# Patient Record
Sex: FEMALE
[De-identification: names, ages, dates, MRNs, and addresses within clinical notes are randomized per-mention and may not be internally consistent; named-entity substitution may affect disease eponyms.]

---

## 2017-05-19 ENCOUNTER — HOSPITAL ENCOUNTER (EMERGENCY)
Dept: HOSPITAL 14 - H.ER | Age: 77
Discharge: HOME | End: 2017-05-19
Payer: MEDICARE

## 2017-05-19 VITALS
OXYGEN SATURATION: 100 % | DIASTOLIC BLOOD PRESSURE: 64 MMHG | SYSTOLIC BLOOD PRESSURE: 132 MMHG | HEART RATE: 70 BPM | RESPIRATION RATE: 15 BRPM | TEMPERATURE: 98 F

## 2017-05-19 DIAGNOSIS — E78.00: ICD-10-CM

## 2017-05-19 DIAGNOSIS — M25.461: ICD-10-CM

## 2017-05-19 DIAGNOSIS — M25.552: ICD-10-CM

## 2017-05-19 DIAGNOSIS — E03.9: ICD-10-CM

## 2017-05-19 DIAGNOSIS — S09.90XA: Primary | ICD-10-CM

## 2017-05-19 DIAGNOSIS — E11.9: ICD-10-CM
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
## 2017-05-19 DIAGNOSIS — M25.511: ICD-10-CM

## 2017-05-19 DIAGNOSIS — Z79.899: ICD-10-CM

## 2017-05-19 DIAGNOSIS — M25.551: ICD-10-CM

## 2017-05-19 DIAGNOSIS — I10: ICD-10-CM

## 2017-05-19 DIAGNOSIS — Z79.84: ICD-10-CM
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
## 2017-05-19 NOTE — RAD
PROCEDURE:  Right Knee Radiographs.



HISTORY:



COMPARISON:

None available.



FINDINGS:



BONES:

No acute displaced fracture. 



JOINTS:

No dislocation. 



JOINT EFFUSION:

Probable small suprapatellar joint effusion. 



OTHER FINDINGS:

None.



IMPRESSION:

Probable small suprapatellar joint effusion. 



No acute displaced fracture or dislocation identified. Ture, 

dislocation, or significant joint effusion identified.



If symptoms persist, or if there is continued clinical concern, x-ray 

follow-up in 7-10 days should be considered.

## 2017-05-19 NOTE — ED PDOC
HPI: General Adult


Time Seen by Provider: 05/19/17 16:50


Chief Complaint (Nursing): Trauma


Chief Complaint (Provider): Hip pain, right shoulder pain 


History Per: Patient


History/Exam Limitations: no limitations


Onset/Duration Of Symptoms: Days


Have you had recent travel within the past 21 days to any of the following 

countries: [place], [place], [place] or [place]?: No


Current Symptoms Are (Timing): Still Present (.)


Additional Complaint(s): 





Pt state she was trying to make her bed last night when she fell. Pt states 

when she was coming off the bed she miss judged and slipped ffalling to the 

ground. PT reports hitting the left posterior head. No LOC. Pt states she was 

able to get up and get back in bed. Pt states she was sore when she woke up and 

took motrin. Pt states she is feeling better but has hip pain, worse on the 

right and right shoulder pain





Pt reports localized scalp pain but not headache. = 





Past Medical History


Reviewed: Historical Data, Nursing Documentation, Vital Signs


Vital Signs: 


 Last Vital Signs











Temp  98.3 F   05/19/17 16:41


 


Pulse  65   05/19/17 16:41


 


Resp  16   05/19/17 16:41


 


BP  161/83 H  05/19/17 16:41


 


Pulse Ox  99   05/19/17 19:37














- Medical History


PMH: Arthritis, Diabetes (type II), HTN, Hypercholesterolemia, Hyperlipidemia, 

Hypothyroidism


   Denies: Chronic Kidney Disease





- Surgical History


Other surgeries: Right shoulder





- Family History


Family History: States: Unknown Family Hx





- Home Medications


Home Medications: 


 Ambulatory Orders











 Medication  Instructions  Recorded


 


Levothyroxine [Synthroid] 75 mcg PO DAILY 12/27/15


 


Glimepiride [Amaryl] 1 mg PO BID 12/09/16


 


Metformin ER [Glucophage XR] 750 mg PO DAILY 12/09/16


 


Metoprolol Tartrate [Lopressor] 50 mg PO Q12H 12/09/16


 


Simvastatin [Zocor] 20 mg PO HS 12/09/16


 


Valsartan [Diovan] 320 mg PO DAILY 12/09/16














- Allergies


Allergies/Adverse Reactions: 


 Allergies











Allergy/AdvReac Type Severity Reaction Status Date / Time


 


No Known Allergies Allergy   Verified 12/27/15 07:51














Review of Systems


ROS Statement: Except As Marked, All Systems Reviewed And Found Negative


Musculoskeletal: Positive for: Shoulder Pain, Leg Pain





Physical Exam





- Reviewed


Nursing Documentation Reviewed: Yes


Vital Signs Reviewed: Yes





- Physical Exam


Appears: Positive for: Well, Non-toxic, No Acute Distress


Head Exam: Positive for: ATRAUMATIC, NORMAL INSPECTION, NORMOCEPHALIC


Skin: Positive for: Normal Color, Warm, DRY


Eye Exam: Positive for: EOMI, Normal appearance, PERRL


ENT: Positive for: Normal ENT Inspection


Neck: Positive for: Normal, Painless ROM


Cardiovascular/Chest: Positive for: Regular Rate, Rhythm


Respiratory: Positive for: CNT, Normal Breath Sounds


Gastrointestinal/Abdominal: Positive for: Normal Exam, Bowel Sounds, Soft


Back: Positive for: Normal Inspection


Extremity: Positive for: Normal ROM.  Negative for: Tenderness, Deformity, 

Swelling


Neurologic/Psych: Positive for: Alert, CNs II-XII, Oriented, Mood/Affect, 

Cerebellar Tests, Gait.  Negative for: Motor/Sensory Deficits, Aphasia, Facial 

Droop





- ECG


O2 Sat by Pulse Oximetry: 99


Pulse Ox Interpretation: Normal





Medical Decision Making


Medical Decision Making: 





Pt ambulated to the rest room. Pt reports feeling much better on re-evaluation 

without medications. 





x-rays are all normal. 





Disposition





- Clinical Impression


Clinical Impression: 


 Fall, Head injury








- Patient ED Disposition


Is Patient to be Admitted: No


Counseled Patient/Family Regarding: Diagnosis, Need For Followup





- Disposition


Referrals: 


[facility] [Outside]


Disposition: Routine/Home


Disposition Time: 19:36


Condition: GOOD


Instructions:  Fall Prevention (ED)


Print Language: French

## 2017-05-19 NOTE — RAD
PROCEDURE:  Radiographs of the Right Shoulder



HISTORY:

pain, s/p fall



COMPARISON:

None available.



FINDINGS:



BONES:

Postsurgical changes, right humeral head. No acute displaced 

fracture. The distal clavicle and underlying ribs appear intact. 

Degenerative changes of the included portions thoracic spine. 



JOINTS:

No acute dislocation. 



SOFT TISSUES:

Soft tissues appear unremarkable.



IMPRESSION:

No acute displaced fracture or dislocation evident. 



If symptoms persist or if there is continued clinical concern, x-ray 

follow-up in 7-10 days should be considered.

## 2017-05-19 NOTE — RAD
PROCEDURE:  Left Knee Radiographs.



HISTORY:



COMPARISON:

None available



FINDINGS:



BONES:

No acute displaced fracture. 



JOINTS:

No dislocation. 



JOINT EFFUSION:

Small suprapatellar joint effusion. 



OTHER FINDINGS:

None.



IMPRESSION:

Small suprapatellar joint effusion. 



No acute displaced fracture or dislocation identified. If symptoms 

persist, or if there is continued clinical concern, x-ray follow-up 

in 7-10 days should be considered.

## 2017-05-20 NOTE — RAD
Indication: fell off bed, hip pain bilateral



Hip bilateral with pelvis 



Comparison: CT abdomen and pelvis performed 12/9/16 



Findings: 



Osseous demineralization limits evaluation for acute fracture lines.  

Degenerative changes including joint space narrowing of bilateral hip 

joints. Degenerative changes of the included portions lower lumbar 

spine. Examination limited by habitus. No acute displaced fracture or 

dislocation evident. Vascular calcifications.  Soft tissues appear 

grossly unremarkable. No evidence of radiopaque foreign body. 



Impression: 



Osseous demineralization.  Degenerative changes. 



No acute displaced fracture or dislocation identified.  If high 

clinical index of suspicion for occult fracture persists, recommend 

further evaluation with cross-sectional imaging.  Otherwise, If 

symptoms persist or if there is continued clinical concern, x-ray 

follow-up in 7-10 days should be considered.

## 2018-06-13 ENCOUNTER — HOSPITAL ENCOUNTER (EMERGENCY)
Dept: HOSPITAL 14 - H.ER | Age: 78
Discharge: HOME | End: 2018-06-13
Payer: MEDICARE

## 2018-06-13 VITALS — TEMPERATURE: 98 F | HEART RATE: 83 BPM | DIASTOLIC BLOOD PRESSURE: 82 MMHG | SYSTOLIC BLOOD PRESSURE: 149 MMHG

## 2018-06-13 VITALS — RESPIRATION RATE: 16 BRPM

## 2018-06-13 DIAGNOSIS — E03.9: ICD-10-CM

## 2018-06-13 DIAGNOSIS — I10: ICD-10-CM

## 2018-06-13 DIAGNOSIS — J06.9: Primary | ICD-10-CM

## 2018-06-13 DIAGNOSIS — E78.00: ICD-10-CM

## 2018-06-13 DIAGNOSIS — Z79.84: ICD-10-CM

## 2018-06-13 DIAGNOSIS — E11.9: ICD-10-CM

## 2018-06-13 LAB
ALBUMIN SERPL-MCNC: 4.1 G/DL (ref 3.5–5)
ALBUMIN/GLOB SERPL: 1.1 {RATIO} (ref 1–2.1)
ALT SERPL-CCNC: 24 U/L (ref 9–52)
AST SERPL-CCNC: 42 U/L (ref 14–36)
BASOPHILS # BLD AUTO: 0 K/UL (ref 0–0.2)
BASOPHILS NFR BLD: 0.5 % (ref 0–2)
BUN SERPL-MCNC: 13 MG/DL (ref 7–17)
CALCIUM SERPL-MCNC: 9.3 MG/DL (ref 8.4–10.2)
EOSINOPHIL # BLD AUTO: 0.1 K/UL (ref 0–0.7)
EOSINOPHIL NFR BLD: 1 % (ref 0–4)
ERYTHROCYTE [DISTWIDTH] IN BLOOD BY AUTOMATED COUNT: 13.3 % (ref 11.5–14.5)
GFR NON-AFRICAN AMERICAN: > 60
HGB BLD-MCNC: 11.8 G/DL (ref 12–16)
LYMPHOCYTES # BLD AUTO: 2.2 K/UL (ref 1–4.3)
LYMPHOCYTES NFR BLD AUTO: 24.7 % (ref 20–40)
MCH RBC QN AUTO: 30.5 PG (ref 27–31)
MCHC RBC AUTO-ENTMCNC: 33.3 G/DL (ref 33–37)
MCV RBC AUTO: 91.6 FL (ref 81–99)
MONOCYTES # BLD: 0.8 K/UL (ref 0–0.8)
MONOCYTES NFR BLD: 9.6 % (ref 0–10)
NEUTROPHILS # BLD: 5.6 K/UL (ref 1.8–7)
NEUTROPHILS NFR BLD AUTO: 64.2 % (ref 50–75)
NRBC BLD AUTO-RTO: 0 % (ref 0–0)
PLATELET # BLD: 305 K/UL (ref 130–400)
PMV BLD AUTO: 8.3 FL (ref 7.2–11.7)
RBC # BLD AUTO: 3.85 MIL/UL (ref 3.8–5.2)
WBC # BLD AUTO: 8.7 K/UL (ref 4.8–10.8)

## 2018-06-13 NOTE — ED PDOC
History of Present Illness


History of Present Illness: 


77 year old female with a history of HTN, DM, and hypothyroidism presents to 

the ED with flu like symptoms for 5 days. She complains of cough productive of 

yellow sputum, nasal congestion and body aches as well as a mild headache, 

fever and nausea that developed yesterday. Patient visited her PMD yesterday 

who gave her Rx for Doxycycline and Montelukast. She reports feeling worse 

since taking the medications. Denies vomiting. 





PMD: Dr. Franky Mascorro








HPI: Influenza


Time Seen by Provider: 18 19:25


Chief Complaint: Flu-like Symptoms


Chief Complaint (Provider): Flu-like Symptoms


History Per: Patient


Exam Limitations: no limitations


Onset/Duration Of Symptoms: Days (x 5)


Symptoms include: fever, headache, bodyaches, cough, nasal congestion


Risk factors for flu complications: Yes: adult > 65 years





Past Medical History


Reviewed: Historical Data, Nursing Documentation, Vital Signs


Vital Signs: 


 Last Vital Signs











Temp  98.5 F   18 19:38


 


Pulse  91 H  18 19:38


 


Resp  16   18 19:38


 


BP  154/76 H  18 19:38


 


Pulse Ox  97   18 19:38














- Medical History


PMH: Arthritis, Diabetes (type II), HTN, Hypercholesterolemia, Hyperlipidemia, 

Hypothyroidism


   Denies: Chronic Kidney Disease





- Surgical History


Other surgeries: shoulder surgery and cardiac ablasion





- Family History


Family History: States: Unknown Family Hx





- Home Medications


Home Medications: 


 Ambulatory Orders











 Medication  Instructions  Recorded


 


Levothyroxine [Synthroid] 75 mcg PO DAILY 12/27/15


 


Glimepiride [Amaryl] 1 mg PO BID 16


 


MetFORMIN ER [Glucophage XR] 750 mg PO DAILY 16


 


Metoprolol Tartrate [Lopressor] 50 mg PO Q12H 16


 


Simvastatin [Zocor] 20 mg PO HS 16


 


Valsartan [Diovan] 320 mg PO DAILY 16


 


Albuterol HFA [Ventolin HFA 90 1 - 2 puff IH Q4H PRN #1 bottle 18





mcg/actuation (8 g)]  


 


Azithromycin [Zithromax] 250 mg PO DAILY #6 tab 18














- Allergies


Allergies/Adverse Reactions: 


 Allergies











Allergy/AdvReac Type Severity Reaction Status Date / Time


 


No Known Allergies Allergy   Verified 12/27/15 07:51














Review of Systems


ROS Statement: Except As Marked, All Systems Reviewed And Found Negative


Constitutional: Positive for: Fever (yesterday and today), Other (body aches)


ENT: Positive for: Nose Congestion


Respiratory: Positive for: Cough, Sputum (yellow sputum)


Gastrointestinal: Positive for: Nausea (mild ).  Negative for: Vomiting, 

Diarrhea


Neurological: Positive for: Headache





Physical Exam





- Reviewed


Nursing Documentation Reviewed: Yes


Vital Signs Reviewed: Yes





- Physical Exam


Appears: Positive for: Non-toxic


Head Exam: Positive for: ATRAUMATIC, NORMAL INSPECTION, NORMOCEPHALIC


Skin: Positive for: Normal Color, Warm, Dry


Eye Exam: Positive for: EOMI, Normal appearance, PERRL


ENT: Positive for: Nasal Congestion


Neck: Positive for: Normal, Painless ROM, Supple


Cardiovascular/Chest: Positive for: Regular Rate, Rhythm.  Negative for: Murmur


Respiratory: Positive for: Normal Breath Sounds.  Negative for: Respiratory 

Distress


Gastrointestinal/Abdominal: Positive for: Normal Exam, Soft.  Negative for: 

Tenderness


Extremity: Positive for: Normal ROM.  Negative for: Deformity


Neurologic/Psych: Positive for: Alert, Oriented.  Negative for: Motor/Sensory 

Deficits





Medical Decision Making


Medical Decision Makin:11


Impression: flu like symptoms


Initial Plan: 


--CMP 


--CBC 


--Chest x-ray 


--Albuterol .083% 2.5 mg INH 


--Peak flow pre/post


--Influenza AB 





Chest x-ray is negative for pneumonia. 


Results for influenza are negative. 





Time; 22:07 


--Patient reports improvement of symptoms. pt in no respiratory distress


Medications will be changed due to the others making her sick. 


Advised to take prescriptions for albuterol pump and Zithromax. 











--------------------------------------------------------------------------------

-----------------





Scribe Attestation:





Documented by Arlene Dawson, acting as a scribe for Shreyas Resendiz MD 





Provider Scribe Attestation:





All medical record entries made by the Scribe were at my direction and 

personally dictated by me. I have reviewed the chart and agree that the record 

accurately reflects my personal performance of the history, physical exam, 

medical decision making, and the department course for this patient. I have 

also personally directed, reviewed, and agree with the discharge instructions 

and disposition.





- Laboratory Results


Result Diagrams: 


 18 20:39





 18 20:39





- ECG


O2 Sat by Pulse Oximetry: 97





Disposition





- Clinical Impression


Clinical Impression: 


 Upper respiratory infection








- Patient ED Disposition


Is Patient to be Admitted: No


Counseled Patient/Family Regarding: Studies Performed, Diagnosis, Need For 

Followup





- Disposition


Disposition: Routine/Home


Disposition Time: 21:25


Condition: IMPROVED


Additional Instructions: 


follow up with your primary doctor in 1-2 days


return to the ED with any worsening or concerning symptoms 


Prescriptions: 


Albuterol HFA [Ventolin HFA 90 mcg/actuation (8 g)] 1 - 2 puff IH Q4H PRN #1 

bottle


 PRN Reason: Wheezing


Azithromycin [Zithromax] 250 mg PO DAILY #6 tab


Instructions:  Acute Bronchitis, Adult (DC)


Forms:  [employer] [facility] (English)


Print Language: Danish

## 2018-06-14 VITALS — OXYGEN SATURATION: 97 %

## 2018-06-14 NOTE — RAD
HISTORY:

Cough



COMPARISON:

02/24/2012.



TECHNIQUE:

Chest PA and lateral



FINDINGS:



LUNGS:

No active pulmonary disease.



PLEURA:

No significant pleural effusion identified. No pneumothorax apparent.



CARDIOVASCULAR:

 No radiographic findings to suggest acute or significant 

cardiovascular disease.



OSSEOUS STRUCTURES:

No significant abnormalities.



VISUALIZED UPPER ABDOMEN:

Normal.



OTHER FINDINGS:

None.



IMPRESSION:

No active disease. No significant interval change compared to the 

prior examination(s).

## 2018-10-30 ENCOUNTER — HOSPITAL ENCOUNTER (EMERGENCY)
Dept: HOSPITAL 14 - H.ER | Age: 78
LOS: 1 days | Discharge: HOME | End: 2018-10-31
Payer: MEDICARE

## 2018-10-30 VITALS
SYSTOLIC BLOOD PRESSURE: 153 MMHG | DIASTOLIC BLOOD PRESSURE: 72 MMHG | RESPIRATION RATE: 16 BRPM | OXYGEN SATURATION: 98 % | TEMPERATURE: 98.2 F | HEART RATE: 76 BPM

## 2018-10-30 DIAGNOSIS — K44.9: ICD-10-CM

## 2018-10-30 DIAGNOSIS — E03.9: ICD-10-CM

## 2018-10-30 DIAGNOSIS — E78.00: ICD-10-CM

## 2018-10-30 DIAGNOSIS — Z79.84: ICD-10-CM

## 2018-10-30 DIAGNOSIS — R10.9: Primary | ICD-10-CM

## 2018-10-30 DIAGNOSIS — K76.89: ICD-10-CM

## 2018-10-30 DIAGNOSIS — E11.9: ICD-10-CM

## 2018-10-30 DIAGNOSIS — K57.90: ICD-10-CM

## 2018-10-30 DIAGNOSIS — K40.90: ICD-10-CM

## 2018-10-30 DIAGNOSIS — I10: ICD-10-CM

## 2018-10-30 LAB
ALBUMIN SERPL-MCNC: 3.9 G/DL (ref 3.5–5)
ALBUMIN/GLOB SERPL: 1.1 {RATIO} (ref 1–2.1)
ALT SERPL-CCNC: 27 U/L (ref 9–52)
APTT BLD: 36.6 SECONDS (ref 25.6–37.1)
AST SERPL-CCNC: 24 U/L (ref 14–36)
BASOPHILS # BLD AUTO: 0.1 K/UL (ref 0–0.2)
BASOPHILS NFR BLD: 0.9 % (ref 0–2)
BUN SERPL-MCNC: 20 MG/DL (ref 7–17)
CALCIUM SERPL-MCNC: 9.1 MG/DL (ref 8.4–10.2)
EOSINOPHIL # BLD AUTO: 0.2 K/UL (ref 0–0.7)
EOSINOPHIL NFR BLD: 1.6 % (ref 0–4)
ERYTHROCYTE [DISTWIDTH] IN BLOOD BY AUTOMATED COUNT: 14.4 % (ref 11.5–14.5)
GFR NON-AFRICAN AMERICAN: > 60
HGB BLD-MCNC: 12.3 G/DL (ref 12–16)
INR PPP: 1.1
LYMPHOCYTES # BLD AUTO: 3.6 K/UL (ref 1–4.3)
LYMPHOCYTES NFR BLD AUTO: 31.4 % (ref 20–40)
MCH RBC QN AUTO: 30.2 PG (ref 27–31)
MCHC RBC AUTO-ENTMCNC: 33.2 G/DL (ref 33–37)
MCV RBC AUTO: 91.1 FL (ref 81–99)
MONOCYTES # BLD: 0.7 K/UL (ref 0–0.8)
MONOCYTES NFR BLD: 6 % (ref 0–10)
NEUTROPHILS # BLD: 6.8 K/UL (ref 1.8–7)
NEUTROPHILS NFR BLD AUTO: 60.1 % (ref 50–75)
NRBC BLD AUTO-RTO: 0 % (ref 0–0)
PLATELET # BLD: 365 K/UL (ref 130–400)
PMV BLD AUTO: 8 FL (ref 7.2–11.7)
PROTHROMBIN TIME: 12.5 SECONDS (ref 9.8–13.1)
RBC # BLD AUTO: 4.06 MIL/UL (ref 3.8–5.2)
WBC # BLD AUTO: 11.4 K/UL (ref 4.8–10.8)

## 2018-10-30 PROCEDURE — 74177 CT ABD & PELVIS W/CONTRAST: CPT

## 2018-10-30 PROCEDURE — 80053 COMPREHEN METABOLIC PANEL: CPT

## 2018-10-30 PROCEDURE — 99284 EMERGENCY DEPT VISIT MOD MDM: CPT

## 2018-10-30 PROCEDURE — 85610 PROTHROMBIN TIME: CPT

## 2018-10-30 PROCEDURE — 85025 COMPLETE CBC W/AUTO DIFF WBC: CPT

## 2018-10-30 PROCEDURE — 85730 THROMBOPLASTIN TIME PARTIAL: CPT

## 2018-10-30 NOTE — ED PDOC
- Laboratory Results


Result Diagrams: 


                                 10/30/18 21:01





                                 10/30/18 21:40





- ECG


O2 Sat by Pulse Oximetry: 98 (RA)


Pulse Ox Interpretation: Normal





Medical Decision Making


Medical Decision Makin:00 


--Patient signed out to this provider by Dr. Velazquez pending CT and reevaluation. 





00:59 


Abd Pelvis CT w PO & IV contrast


COMMENTS:


Small sliding hiatal hernia.


Benign scattered simple hepatic cysts with the largest measuring 2.3 cm.


Chronic diverticulosis.


Mild amount of fecal residue in the colon.


Mild apparent thickening of the mid aspect of the sigmoid.


Fat containing left inguinal hernia without incarceration.


The liver is of uniform attenuation without mass or defect. There is no intra or

extrahepatic biliary ductal dilatation. The spleen is normal. The gallbladder is

within normal limits. The pancreas is of normal contour and attenuation 

characteristics. There is no evidence of adrenal mass.


Both kidneys demonstrate prompt and equal nephrograms. The kidneys are normal in

size, shape and configuration. There is no evidence of renal or ureteral mass. 

No renal or ureteral calculi are identified. There is no hydroureter or 

hydronephrosis.


No evidence for appendicitis. 


No evidence for small or large bowel obstruction. There is no evidence of 

abdominal ascites or lymphadenopathy.


There is no evidence of intrinsic or extrinsic bladder mass. There is no pelvic 

ascites or lymphadenopathy.


Bilateral basilar subsegmental atelectatic pulmonary changes.


Mild cardiomegaly.


Images of the lung bases show no evidence of pleural or parenchymal mass. There 

are no pleural effusions. The bony structures are free of lytic or blastic 

lesions.





IMPRESSION: 


Small sliding hiatal hernia.


Benign scattered simple hepatic cysts with the largest measuring 2.3 cm.


Chronic diverticulosis.


Mild amount of fecal residue in the colon.


Mild apparent thickening of the mid aspect of the sigmoid. Underdistention, 

spasm versus mild colitis.


Fat containing left inguinal hernia without incarceration.








01:15 


--CT discussed with patient. She reports that symptoms are resolved and she has 

not experienced rectal bleeding since she arrived in the ED. 


--Labs reviewed and revealed no clinically significant abnormalities. Diagnosis 

is diverticulosis. Return precautions provided. She will follow up with PMD. 





 

--------------------------------------------------------------------------------


-----------------


Scribe Attestation:


Documented by Arlene Dawson, acting as a scribe for Ward Cho MD





Provider Scribe Attestation:


All medical record entries made by the Scribe were at my direction and 

personally dictated by me. I have reviewed the chart and agree that the record 

accurately reflects my personal performance of the history, physical exam, 

medical decision making, and the department course for this patient. I have also

personally directed, reviewed, and agree with the discharge instructions and 

disposition.











Disposition





- Clinical Impression


Clinical Impression: 


 Abdominal pain








- POA


Present On Arrival: None





- Disposition


Disposition: Routine/Home


Disposition Time: 01:15


Condition: STABLE


Additional Instructions: 





JUANA SHEPARD, thank you for letting us take care of you today. Your provider 

was Ward Cho MD and you were treated for RECTAL PAIN. The emergency 

medical care you received today was directed at your acute symptoms. If you were

prescribed any medication, please fill it and take as directed. It may take 

several days for your symptoms to resolve. Return to the Emergency Department if

your symptoms worsen, do not improve, or if you have any other problems.





Please contact your doctor or call one of the physicians/clinics you have been 

referred to that are listed on the Patient Visit Information form that is 

included in your discharge packet. Bring any paperwork you were given at 

discharge with you along with any medications you are taking to your follow up 

visit. Our treatment cannot replace ongoing medical care by a primary care 

provider outside of the emergency department.





Thank you for allowing the [facility] team to be part of your care today.








If you had an X-Ray or CT scan: A Radiologist will review the ED reading if any 

change in treatment is needed we will contact you.***





If you had a blood, urine, or wound culture: It will take several days for the 

results, if any change in treatment is needed we will contact you.***





If you had an STI test: It will take 48 hours for the results. Please call after

1 week if you have not heard back.***


Prescriptions: 


Dicyclomine [Bentyl] 20 mg PO Q12 PRN #20 tab


 PRN Reason: abdominal pain/diarrhea


Instructions:  Diverticulosis


Forms:  [facility] (English)


Print Language: Uzbek

## 2018-10-31 NOTE — CT
Date of service: 



10/30/2018



PROCEDURE:  CT Abdomen and Pelvis with contrast



HISTORY:

abd pain



COMPARISON:

12/9/2016



TECHNIQUE:

Contrast dose: 90 mL Omnipaque 300



Radiation dose:



Total exam DLP = 452.04 mGy-cm.



This CT exam was performed using one or more of the following dose 

reduction techniques: Automated exposure control, adjustment of the 

mA and/or kV according to patient size, and/or use of iterative 

reconstruction technique.



FINDINGS:



LOWER THORAX:

Bibasilar discoid atelectasis and/or scarring.  Concomitant 

bronchiectasis and patchy mosaic attenuation consistent with uneven 

ventilation profusion and/or air trapping.  Not significantly changed 

compared the prior study. 



LIVER:

The multiple benign-appearing cystic masses are similar to the 

12/9/2016 study.  Benign bilateral hepatic cysts are compatible with 

this. 



GALLBLADDER AND BILE DUCTS:

Unremarkable. 



PANCREAS:

Unremarkable. No gross lesion or ductal dilatation.



SPLEEN:

Unremarkable. 



ADRENALS:

Unremarkable. No mass. 



KIDNEYS AND URETERS:

Unremarkable. No hydronephrosis. No solid mass. 



VASCULATURE:

. No aortic aneurysm. There is presence (very minimal) of aortic 

atherosclerotic calcification and (probable trace) mural plaque on 

cross sectional studies.



BOWEL:

Rectosigmoid diverticulosis with redundancy.  One loop contiguous 

with probable left ovary--similar in appearance (axial series 3, 

image 117).  No complicating diverticulitis seen.  No bowel 

obstruction.  



APPENDIX:

Normal appendix. 



PERITONEUM:

Unremarkable. No free fluid. No free air. 



LYMPH NODES:

Unremarkable. No enlarged lymph nodes. 



BLADDER:

Unremarkable. 



REPRODUCTIVE:

Unremarkable. 



BONES:

Interval compression deformity of the L1 vertebral body with 

increased sclerosis and interval increase anterior spondylosis with 

T12-L1 intervening disc space narrowing.  The compression deformity 

is an interval change since the prior exam.  No retropulsed ossific 

fragments seen.  Interval progressive anterior thoraco lumbar fundal 

oasis noted. 



OTHER FINDINGS:

Small periumbilical fat only containing hernia similar-appearing.



Fat containing left groin hernia.  No bowel containing hernia 

appreciated. 



Small probable small sliding hiatal hernia. 



IMPRESSION:

Rectosigmoid redundancy with diverticulosis.  No complicating 

diverticulitis or bowel obstruction seen.



Gallstones.  No dilated ducts.



Similar benign-appearing left and right hepatic lobe cysts 



Interval mild compression deformity loss of height approximately 25 

percent of the L1 vertebral body progressive degenerative changes 

here.  No retropulsed ossific fragments noted. 



Concordant results (preliminary interpretation) provided by [facility]ar[facility].

## 2019-01-01 ENCOUNTER — HOSPITAL ENCOUNTER (EMERGENCY)
Dept: HOSPITAL 14 - H.ER | Age: 79
Discharge: HOME | End: 2019-01-01
Payer: MEDICARE

## 2019-01-01 VITALS
DIASTOLIC BLOOD PRESSURE: 76 MMHG | HEART RATE: 78 BPM | SYSTOLIC BLOOD PRESSURE: 130 MMHG | RESPIRATION RATE: 19 BRPM | TEMPERATURE: 97.8 F

## 2019-01-01 VITALS — OXYGEN SATURATION: 98 %

## 2019-01-01 DIAGNOSIS — E03.9: ICD-10-CM

## 2019-01-01 DIAGNOSIS — Z79.84: ICD-10-CM

## 2019-01-01 DIAGNOSIS — E11.9: ICD-10-CM

## 2019-01-01 DIAGNOSIS — R05: Primary | ICD-10-CM

## 2019-01-01 DIAGNOSIS — I25.10: ICD-10-CM

## 2019-01-01 DIAGNOSIS — I10: ICD-10-CM

## 2019-01-01 DIAGNOSIS — E78.00: ICD-10-CM

## 2019-01-01 LAB
BACTERIA #/AREA URNS HPF: (no result) /[HPF]
BASOPHILS # BLD AUTO: 0.1 K/UL (ref 0–0.2)
BASOPHILS NFR BLD: 1 % (ref 0–2)
BILIRUB UR-MCNC: NEGATIVE MG/DL
BUN SERPL-MCNC: 17 MG/DL (ref 7–17)
CALCIUM SERPL-MCNC: 9.3 MG/DL (ref 8.4–10.2)
COLOR UR: YELLOW
EOSINOPHIL # BLD AUTO: 0.1 K/UL (ref 0–0.7)
EOSINOPHIL NFR BLD: 1 % (ref 0–4)
ERYTHROCYTE [DISTWIDTH] IN BLOOD BY AUTOMATED COUNT: 13.7 % (ref 11.5–14.5)
GFR NON-AFRICAN AMERICAN: > 60
GLUCOSE UR STRIP-MCNC: (no result) MG/DL
HGB BLD-MCNC: 11.2 G/DL (ref 12–16)
LEUKOCYTE ESTERASE UR-ACNC: (no result) LEU/UL
LYMPHOCYTES # BLD AUTO: 1.2 K/UL (ref 1–4.3)
LYMPHOCYTES NFR BLD AUTO: 17.4 % (ref 20–40)
MCH RBC QN AUTO: 30.8 PG (ref 27–31)
MCHC RBC AUTO-ENTMCNC: 32.6 G/DL (ref 33–37)
MCV RBC AUTO: 94.3 FL (ref 81–99)
MONOCYTES # BLD: 0.9 K/UL (ref 0–0.8)
MONOCYTES NFR BLD: 13.3 % (ref 0–10)
NEUTROPHILS # BLD: 4.7 K/UL (ref 1.8–7)
NEUTROPHILS NFR BLD AUTO: 67.3 % (ref 50–75)
NRBC BLD AUTO-RTO: 0 % (ref 0–0)
PH UR STRIP: 5 [PH] (ref 5–8)
PLATELET # BLD: 338 K/UL (ref 130–400)
PMV BLD AUTO: 8.2 FL (ref 7.2–11.7)
PROT UR STRIP-MCNC: NEGATIVE MG/DL
RBC # BLD AUTO: 3.64 MIL/UL (ref 3.8–5.2)
RBC # UR STRIP: (no result) /UL
SP GR UR STRIP: 1.02 (ref 1–1.03)
SQUAMOUS EPITHIAL: 3 /HPF (ref 0–5)
URINE CLARITY: (no result)
URINE HYALINE CAST: (no result) /HPF (ref 0–2)
UROBILINOGEN UR-MCNC: (no result) MG/DL (ref 0.2–1)
WBC # BLD AUTO: 6.9 K/UL (ref 4.8–10.8)

## 2019-01-01 PROCEDURE — 99283 EMERGENCY DEPT VISIT LOW MDM: CPT

## 2019-01-01 PROCEDURE — 85025 COMPLETE CBC W/AUTO DIFF WBC: CPT

## 2019-01-01 PROCEDURE — 96374 THER/PROPH/DIAG INJ IV PUSH: CPT

## 2019-01-01 PROCEDURE — 81003 URINALYSIS AUTO W/O SCOPE: CPT

## 2019-01-01 PROCEDURE — 71046 X-RAY EXAM CHEST 2 VIEWS: CPT

## 2019-01-01 PROCEDURE — 80048 BASIC METABOLIC PNL TOTAL CA: CPT

## 2019-01-01 NOTE — ED PDOC
HPI: CCC, URI, Sore Throat


Time Seen by Provider: 01/01/19 13:41


Chief Complaint (Nursing): Flu-like Symptoms


Chief Complaint (Provider): cough


History Per: Family (son),  ([facility] ID# [id number])


History/Exam Limitations: no limitations


Onset/Duration Of Symptoms: Days (x4)


Current Symptoms Are (Timing): Still Present


Additional Complaint(s): 


78 year old female with pmHx of DM, HTN, and CAD, arrives to ED with son for an 

evaluation of a dry cough ongoing for 4 days. Patient also reports a sore throat

that causes her headache and back pain. He denies fever, chills, vomiting, 

change in BM, or recent travel. Patient's son additionally states that patient's

appetite has been minimal.





PCP: Dr. Franky Mascorro





Past Medical History


Reviewed: Historical Data, Nursing Documentation, Vital Signs


Vital Signs: 





                                Last Vital Signs











Temp  98.3 F   01/01/19 13:21


 


Pulse  96 H  01/01/19 13:21


 


Resp  18   01/01/19 13:21


 


BP  148/76   01/01/19 13:21


 


Pulse Ox  98   01/01/19 13:21














- Medical History


PMH: Arthritis, Diabetes (type II), HTN, Hypercholesterolemia, Hyperlipidemia, 

Hypothyroidism


   Denies: Chronic Kidney Disease





- Family History


Family History: States: Unknown Family Hx





- Home Medications


Home Medications: 


                                Ambulatory Orders











 Medication  Instructions  Recorded


 


Levothyroxine [Synthroid] 75 mcg PO DAILY 12/27/15


 


Glimepiride [Amaryl] 1 mg PO BID 12/09/16


 


MetFORMIN ER [Glucophage XR] 750 mg PO DAILY 12/09/16


 


Metoprolol Tartrate [Lopressor] 50 mg PO Q12H 12/09/16


 


Simvastatin [Zocor] 20 mg PO HS 12/09/16


 


Valsartan [Diovan] 320 mg PO DAILY 12/09/16


 


Albuterol HFA [Ventolin HFA 90 1 - 2 puff IH Q4H PRN #1 bottle 06/13/18





mcg/actuation (8 g)]  


 


Azithromycin [Zithromax] 250 mg PO DAILY #6 tab 06/13/18


 


Dicyclomine [Bentyl] 20 mg PO Q12 PRN #20 tab 10/31/18


 


Benzonatate [Tessalon Perle] 100 mg PO TID #20 capsule 01/01/19


 


Ibuprofen [Motrin Tab] 600 mg PO Q6 #30 tab 01/01/19


 


Multivitamin with Iron 1 each PO DAILY #12 tablet 01/01/19





[Multivitamins with Iron]  














- Allergies


Allergies/Adverse Reactions: 


                                    Allergies











Allergy/AdvReac Type Severity Reaction Status Date / Time


 


No Known Allergies Allergy   Verified 12/27/15 07:51














Review of Systems


ROS Statement: Except As Marked, All Systems Reviewed And Found Negative


Constitutional: Negative for: Fever, Chills


ENT: Positive for: Throat Pain


Respiratory: Positive for: Cough (dry)


Gastrointestinal: Positive for: Other (decreased appetite).  Negative for: 

Vomiting, Diarrhea, Constipation


Musculoskeletal: Positive for: Back Pain


Neurological: Positive for: Headache





Physical Exam





- Reviewed


Nursing Documentation Reviewed: Yes





- Physical Exam


Appears: Positive for: Well, Non-toxic, No Acute Distress


Head Exam: Positive for: ATRAUMATIC, NORMAL INSPECTION, NORMOCEPHALIC


Skin: Positive for: Normal Color, Warm, DRY


Eye Exam: Positive for: EOMI, Normal appearance, PERRL


ENT: Positive for: Normal ENT Inspection, Other (scratchy voice).  Negative for:

Pharyngeal Erythema, Tonsillar Swelling


Neck: Positive for: Normal, Painless ROM


Cardiovascular/Chest: Positive for: Regular Rate, Rhythm


Respiratory: Positive for: CNT, Normal Breath Sounds


Gastrointestinal/Abdominal: Positive for: Normal Exam, Soft


Extremity: Positive for: Normal ROM


Neurologic/Psych: Positive for: Alert, Oriented





- Laboratory Results


Result Diagrams: 


                                 01/01/19 14:41





                                 01/01/19 14:41





- ECG


O2 Sat by Pulse Oximetry: 98 (RA)


Pulse Ox Interpretation: Normal





Medical Decision Making


Medical Decision Making: 


Time: 1410


Initial Plan: well-appearing, elderly female with cough and decreased appetite. 

Likely viral illness. Plan to hydrate, labs, CXR, and re-eval.


* Labs


* IV fluids


* Toradol 15mg IVP


* Zofran 4mg PO





1648


-Patient states she's feeling better


-Advised symptomatic treatment, Multi-V's, and followup with PMD


-Son states he will take her to her doctor tomorrow





-----------------------

--------------------------------------------------------------------------------


-----------------


Scribe Attestation:


Documented by Vicky Calvin, acting as a scribe for Allan Abdul MD.


Provider Scribe Attestation:


All medical record entries made by the Scribe were at my direction and 

personally dictated by me. I have reviewed the chart and agree that the record 

accurately reflects my personal performance of the history, physical exam, 

medical decision making, and the department course for this patient. I have also

personally directed, reviewed, and agree with the discharge instructions and 

disposition.





Disposition





- Clinical Impression


Clinical Impression: 


 Cough








- Disposition


Referrals: 


Franky Mascorro MD [Medical Doctor] - 


Disposition: Routine/Home


Disposition Time: 16:30


Condition: GOOD


Prescriptions: 


Benzonatate [Tessalon Perle] 100 mg PO TID #20 capsule


Ibuprofen [Motrin Tab] 600 mg PO Q6 #30 tab


Multivitamin with Iron [Multivitamins with Iron] 1 each PO DAILY #12 tablet


Instructions:  Cough in Adults


Forms:  [employer] (English)

## 2019-01-01 NOTE — RAD
Date of service: 



01/01/2019



HISTORY:

 cough 



COMPARISON:

Comparison chest dated 06/13/2018. 



TECHNIQUE:

Chest PA and lateral



FINDINGS:



LUNGS:

There appears to be some minor linear atelectasis and/or scarring 

change in the left and probably to a lesser degree right lung base.



PLEURA:

No significant pleural effusion identified. No pneumothorax apparent.



CARDIOVASCULAR:

Mild aortic atherosclerotic calcification present.



Normal cardiac size. No pulmonary vascular congestion. 



OSSEOUS STRUCTURES:

Postoperative changes of rotator cuff repair right shoulder 



Mild multilevel degenerative spondylosis of the thoracic spine 



VISUALIZED UPPER ABDOMEN:

Normal.



OTHER FINDINGS:

None.



IMPRESSION:

No active disease.

## 2019-01-19 ENCOUNTER — HOSPITAL ENCOUNTER (EMERGENCY)
Dept: HOSPITAL 14 - H.ER | Age: 79
Discharge: HOME | End: 2019-01-19
Payer: MEDICARE

## 2019-01-19 VITALS — OXYGEN SATURATION: 99 % | HEART RATE: 65 BPM | RESPIRATION RATE: 18 BRPM | TEMPERATURE: 98 F

## 2019-01-19 VITALS — SYSTOLIC BLOOD PRESSURE: 124 MMHG | DIASTOLIC BLOOD PRESSURE: 82 MMHG

## 2019-01-19 DIAGNOSIS — Z79.84: ICD-10-CM

## 2019-01-19 DIAGNOSIS — R07.89: Primary | ICD-10-CM

## 2019-01-19 DIAGNOSIS — I10: ICD-10-CM

## 2019-01-19 DIAGNOSIS — E03.9: ICD-10-CM

## 2019-01-19 DIAGNOSIS — E78.00: ICD-10-CM

## 2019-01-19 DIAGNOSIS — E11.9: ICD-10-CM

## 2019-01-19 LAB
ALBUMIN SERPL-MCNC: 4.2 G/DL (ref 3.5–5)
ALBUMIN/GLOB SERPL: 1.2 {RATIO} (ref 1–2.1)
ALT SERPL-CCNC: 21 U/L (ref 9–52)
APTT BLD: 35.8 SECONDS (ref 25.6–37.1)
AST SERPL-CCNC: 30 U/L (ref 14–36)
BASE EXCESS BLDV CALC-SCNC: 5.3 MMOL/L (ref 0–2)
BASOPHILS # BLD AUTO: 0.1 K/UL (ref 0–0.2)
BASOPHILS NFR BLD: 1.3 % (ref 0–2)
BNP SERPL-MCNC: 118 PG/ML (ref 0–900)
BUN SERPL-MCNC: 24 MG/DL (ref 7–17)
CALCIUM SERPL-MCNC: 9.5 MG/DL (ref 8.4–10.2)
EOSINOPHIL # BLD AUTO: 0.1 K/UL (ref 0–0.7)
EOSINOPHIL NFR BLD: 1.8 % (ref 0–4)
ERYTHROCYTE [DISTWIDTH] IN BLOOD BY AUTOMATED COUNT: 13.3 % (ref 11.5–14.5)
GFR NON-AFRICAN AMERICAN: > 60
HGB BLD-MCNC: 11.3 G/DL (ref 12–16)
INR PPP: 1.2
LYMPHOCYTES # BLD AUTO: 2 K/UL (ref 1–4.3)
LYMPHOCYTES NFR BLD AUTO: 29.1 % (ref 20–40)
MCH RBC QN AUTO: 30.2 PG (ref 27–31)
MCHC RBC AUTO-ENTMCNC: 32.7 G/DL (ref 33–37)
MCV RBC AUTO: 92.4 FL (ref 81–99)
MONOCYTES # BLD: 0.6 K/UL (ref 0–0.8)
MONOCYTES NFR BLD: 9.2 % (ref 0–10)
NEUTROPHILS # BLD: 4 K/UL (ref 1.8–7)
NEUTROPHILS NFR BLD AUTO: 58.6 % (ref 50–75)
NRBC BLD AUTO-RTO: 0 % (ref 0–0)
PCO2 BLDV: 46 MMHG (ref 40–60)
PH BLDV: 7.43 [PH] (ref 7.32–7.43)
PLATELET # BLD: 548 K/UL (ref 130–400)
PMV BLD AUTO: 7.7 FL (ref 7.2–11.7)
PROTHROMBIN TIME: 13.7 SECONDS (ref 9.8–13.1)
RBC # BLD AUTO: 3.72 MIL/UL (ref 3.8–5.2)
VENOUS BLOOD FIO2: 21 %
VENOUS BLOOD GAS PO2: 46 MM/HG (ref 30–55)
WBC # BLD AUTO: 6.9 K/UL (ref 4.8–10.8)

## 2019-01-19 PROCEDURE — 80053 COMPREHEN METABOLIC PANEL: CPT

## 2019-01-19 PROCEDURE — 85025 COMPLETE CBC W/AUTO DIFF WBC: CPT

## 2019-01-19 PROCEDURE — 83880 ASSAY OF NATRIURETIC PEPTIDE: CPT

## 2019-01-19 PROCEDURE — 82948 REAGENT STRIP/BLOOD GLUCOSE: CPT

## 2019-01-19 PROCEDURE — 84484 ASSAY OF TROPONIN QUANT: CPT

## 2019-01-19 PROCEDURE — 85610 PROTHROMBIN TIME: CPT

## 2019-01-19 PROCEDURE — 71045 X-RAY EXAM CHEST 1 VIEW: CPT

## 2019-01-19 PROCEDURE — 93005 ELECTROCARDIOGRAM TRACING: CPT

## 2019-01-19 PROCEDURE — 82803 BLOOD GASES ANY COMBINATION: CPT

## 2019-01-19 PROCEDURE — 85730 THROMBOPLASTIN TIME PARTIAL: CPT

## 2019-01-19 PROCEDURE — 99284 EMERGENCY DEPT VISIT MOD MDM: CPT

## 2019-01-19 PROCEDURE — 96360 HYDRATION IV INFUSION INIT: CPT

## 2019-01-19 NOTE — RAD
Date of service: 



01/19/2019



HISTORY:

 possible admission 



COMPARISON:

Chest radiograph dated 01/01/2019. 



FINDINGS:



LUNGS:

No active pulmonary disease.



PLEURA:

No significant pleural effusion identified, no pneumothorax apparent.



CARDIOVASCULAR:

Aortic atherosclerotic calcifications.  Cardiomediastinal silhouette 

stably enlarged. 



OSSEOUS STRUCTURES:

Unchanged.  Right humeral head surgical tacks.



VISUALIZED UPPER ABDOMEN:

Normal.



OTHER FINDINGS:

None.



IMPRESSION:

No active disease.

## 2019-01-19 NOTE — ED PDOC
HPI: Chest Pain


Time Seen by Provider: 19 12:41


Chief Complaint (Nursing): Chest Pain


Chief Complaint (Provider): left sided chest pain


History Per: Patient


History/Exam Limitations: no limitations


Onset/Duration Of Symptoms: Hrs (this morning)


Current Symptoms Are (Timing): Intermittent Episodes


Associated Symptoms: denies: Nausea, Dyspnea


Additional Complaint(s): 





Riya Goff is an 78 year old female, with a past medical history of 

hypercholesterolemia, HTN, diabetes and hypothyroidism, who presents to the 

emergency department complaining of left sided chest pain onset today. Patient 

states episodes last about 30min and occurred twice this morning. Patient 

reports she is compliant with her medications and is otherwise healthy. She 

denies any radiation of pain or associated nausea, vomiting, dizziness, 

shortness of breath, fever or chills. No further medical complaints.





PMD: Franky Mascorro





Past Medical History


Reviewed: Historical Data, Nursing Documentation, Vital Signs


Vital Signs: 





                                Last Vital Signs











Temp  98 F   19 13:42


 


Pulse  65   19 13:42


 


Resp  18   19 13:42


 


BP  114/67   19 13:42


 


Pulse Ox  99   19 13:42














- Medical History


PMH: Arthritis, Diabetes (type II), HTN, Hypercholesterolemia, Hyperlipidemia, 

Hypothyroidism


   Denies: Chronic Kidney Disease





- Surgical History


Surgical History: 





- Family History


Family History: States: Unknown Family Hx





- Social History


Current smoker - smoking cessation education provided: No


Alcohol: None


Drugs: Denies





- Home Medications


Home Medications: 


                                Ambulatory Orders











 Medication  Instructions  Recorded


 


Levothyroxine [Synthroid] 75 mcg PO DAILY 12/27/15


 


Glimepiride [Amaryl] 1 mg PO BID 16


 


MetFORMIN ER [Glucophage XR] 750 mg PO DAILY 16


 


Metoprolol Tartrate [Lopressor] 50 mg PO Q12H 16


 


Simvastatin [Zocor] 20 mg PO HS 16


 


Valsartan [Diovan] 320 mg PO DAILY 16


 


Albuterol HFA [Ventolin HFA 90 1 - 2 puff IH Q4H PRN #1 bottle 18





mcg/actuation (8 g)]  


 


Azithromycin [Zithromax] 250 mg PO DAILY #6 tab 18


 


Dicyclomine [Bentyl] 20 mg PO Q12 PRN #20 tab 10/31/18


 


Benzonatate [Tessalon Perle] 100 mg PO TID #20 capsule 19


 


Ibuprofen [Motrin Tab] 600 mg PO Q6 #30 tab 19


 


Multivitamin with Iron 1 each PO DAILY #12 tablet 19





[Multivitamins with Iron]  














- Allergies


Allergies/Adverse Reactions: 


                                    Allergies











Allergy/AdvReac Type Severity Reaction Status Date / Time


 


No Known Allergies Allergy   Verified 19 12:28














Review of Systems


ROS Statement: Except As Marked, All Systems Reviewed And Found Negative


Constitutional: Negative for: Fever, Chills


Cardiovascular: Positive for: Chest Pain (left sided intermittent)


Respiratory: Negative for: Shortness of Breath


Gastrointestinal: Negative for: Nausea, Vomiting





Physical Exam





- Reviewed


Nursing Documentation Reviewed: Yes


Vital Signs Reviewed: Yes





- Physical Exam


Appears: Positive for: No Acute Distress


Head Exam: Positive for: ATRAUMATIC, NORMAL INSPECTION, NORMOCEPHALIC


Skin: Positive for: Normal Color, Warm, Dry


Eye Exam: Positive for: Normal appearance, EOMI, PERRL


Neck: Positive for: Normal, Painless ROM, Supple


Cardiovascular/Chest: Positive for: Regular Rate, Rhythm.  Negative for: Murmur


Respiratory: Positive for: Normal Breath Sounds.  Negative for: Respiratory Di

stress


Gastrointestinal/Abdominal: Positive for: Normal Exam, Soft.  Negative for: 

Tenderness, Guarding, Rebound


Back: Positive for: Normal Inspection.  Negative for: L CVA Tenderness, R CVA 

Tenderness, Vertebral Tenderness


Extremity: Positive for: Normal ROM (upper and lower extremities).  Negative 

for: Deformity, Swelling


Neurologic/Psych: Positive for: Alert, Oriented.  Negative for: Motor/Sensory 

Deficits





- Laboratory Results


Result Diagrams: 


                                 19 13:20





                                 19 13:20


Lab Results: 





                                        











pO2  46 mm/Hg (30-55)   19  13:06    


 


VBG pH  7.43  (7.32-7.43)   19  13:06    


 


VBG pCO2  46 mmHg (40-60)   19  13:06    


 


VBG HCO3  28.7 mmol/L  19  13:06    


 


VBG Total CO2  31.9 mmol/L (22-28)  H  19  13:06    


 


VBG O2 Sat (Calc)  88.0 % (40-65)  H  19  13:06    


 


VBG Base Excess  5.3 mmol/L (0.0-2.0)  H  19  13:06    


 


VBG Potassium  3.8 mmol/L (3.6-5.2)   19  13:06    


 


Sodium  130.0 mmol/L (132-148)  L  19  13:06    


 


Chloride  96.0 mmol/L ()  L  19  13:06    


 


Glucose  175 mg/dL ()  H  19  13:06    


 


Lactate  1.3 mmol/L (0.7-2.1)   19  13:06    


 


FiO2  21.0 %  19  13:06    








                                        











PT  13.7 Seconds (9.8-13.1)  H  19  13:20    


 


INR  1.2   19  13:20    


 


APTT  35.8 Seconds (25.6-37.1)   19  13:20    








                                        











Troponin I  < 0.0120 ng/mL (0.00-0.120)   19  13:20    








                                        











NT-Pro-B Natriuret Pep  118 pg/ml (0-900)   19  13:20    








                                        











Total Bilirubin  0.4 mg/dl (0.2-1.3)   19  13:20    


 


AST  30 U/L (14-36)   19  13:20    


 


ALT  21 U/L (9-52)   19  13:20    


 


Alkaline Phosphatase  70 U/L ()   19  13:20    


 


Total Protein  7.8 G/DL (6.3-8.2)   19  13:20    


 


Albumin  4.2 g/dL (3.5-5.0)   19  13:20    


 


Globulin  3.6 gm/dL (2.2-3.9)   19  13:20    


 


Albumin/Globulin Ratio  1.2  (1.0-2.1)   19  13:20    














- ECG


O2 Sat by Pulse Oximetry: 99 (RA)


Pulse Ox Interpretation: Normal





Medical Decision Making


Medical Decision Making: 





Time: 12:41


Initial Impression: Chest pain





Initial Plan:





--EKG


--Aspirin 325mg PO


--NaCl 1,000 ml IV 1,000 mls/hr


--Reevaluation








Work up for chest pain, basic labs, troponin, CXR and reevaluate patient. 

Patient is unsure of medications, will give ASA at this time.








15:25


Discussed with daughter and patient. Labs normal, EKG normal and patient has 

been asymptomatic during ED visit. Daughter comfortable going home with mother 

and will stay with her through the weekend. Patient advised to contact 

cardiologist and PMD today to arrange follow up on Monday. Patient instructed to

return immediately if new symptoms arise or worsen.








-----------------------------------

--------------------------------------------------------------   


Scribe Attestation:


Documented by Kobe Connell, acting as a scribe for Sabine Garzon MD





Provider Scribe Attestation:


All medical record entries made by the Scribe were at my direction and 

personally dictated by me. I have reviewed the chart and agree that the record 

accurately reflects my personal performance of the history, physical exam, 

medical decision making, and the department course for this patient. I have also

personally directed, reviewed, and agree with the discharge instructions and dis

position.





Disposition





- Clinical Impression


Clinical Impression: 


 Chest pain








- Disposition


Disposition: Routine/Home


Disposition Time: 16:05


Condition: IMPROVED


Additional Instructions: 


Follow up with primary medical doctor and cardiologist within 48 hours.  Return 

to the emergency department immediately if symptoms worsen or if new symptoms 

develop.


Instructions:  Chest Pain (DC)


Forms:  [employer] (Guinean)


Print Language: Cook Islander

## 2019-01-20 NOTE — CARD
--------------- APPROVED REPORT --------------





Date of service: 01/19/2019



EKG Measurement

Heart [id number]

CA 112P-1

[id number]

[id number]

[id number]



<Conclusion>

Normal sinus rhythm

Normal ECG

## 2024-11-10 NOTE — ED PDOC
Vascular Surgery - Discharge Note  Aldo Knott PA-C      Patient Name: Heriberto Qiu                   : 1960     MRN: [id number]   Date of Admission: 2024  Date of Exam: 11/10/2024  Vascular Physician: Gerhard Platt MD    Outcome   Final Diagnosis:   Infrarenal abdominal aortic aneurysm (AAA) without rupture    Post-Op Information:  REPAIR-ANEURYSM-ABDOMINAL AORTIC-ENDOVASCULAR (AAA) (N/A)    Hospital Course:  Heriberto Qiu is a 64-year-old male with infrarenal AAA status post EVAR.  Patient had no acute complications however was fairly deconditioned and weak postoperatively.  Therapy did evaluate the patient and recommended home health as well as a Rollator walker which the patient did utilize prior to surgery.  NSTEMI was found likely due to dehydration and worsening anemia.  Cardiology was consulted and recommended hemoglobin >9 due to recent PCI of LAD.  H&H showed much improvement after transfusion of 3 units pRBCs.  Patient ambulating on his own at this time.  States he is very comfortable and ready for discharge home.  Patient is stable from vascular standpoint with clinic follow up.    Aldo Knott PA-C  Vascular Surgery  [facility]    Patient Discharge Instructions:     Disposition: Home or Self Care    Incision Instructions:   Cleanse wound area with antibacterial soap and pat dry.   Leave dermabond in place (will fall off when appropriate).   OK to shower, avoid tub bath for 10 days.    Notify your healthcare provider:  Fever greater than 100.6  Foul drainage from the incision  Breakdown or opening of the incision  Worsening pain or swelling at the surgical site  Numbness or loss of function to the operative extremity    Follow Up:     In clinic  Please contact the office with any concerns or issues. [phone]    Future Appointments   Date Time Provider Department Center   [facility]  [facility]   2024   HPI: Abdomen


Time Seen by Provider: 10/30/18 20:26


Chief Complaint (Nursing): GI Problem


Chief Complaint (Provider): bloody stools


History Per: Patient


History/Exam Limitations: no limitations


Onset/Duration Of Symptoms: Days (x3)


Current Symptoms Are (Timing): Still Present


Associated Symptoms: denies: Fever, Chills, Nausea, Vomiting, Back Pain, Chest 

Pain


Last Bowel Movement: Today (this morning)


Additional Complaint(s): 





Riya Goff is an 78 year old female, with a past medical history of 

constipation, diabetes, hyperlipidemia and thyroid problems, who presents to the

emergency department complaining of bloody stools onset for x3 days. Patient 

states she has a history of constipation and is able to pass bowel movements but

with difficulty. Last bowel movement was this morning. She denies any fever, 

chills, abdominal pain, nausea, vomiting, back pain, chest pain, shortness of 

breath, headache, dizziness, weakness, numbness or tingling. No further medical 

complaints.





PMD: Franky Mascorro 





Past Medical History


Reviewed: Historical Data, Nursing Documentation, Vital Signs


Vital Signs: 





                                Last Vital Signs











Temp  98.2 F   10/30/18 20:06


 


Pulse  76   10/30/18 20:06


 


Resp  16   10/30/18 20:06


 


BP  153/72 H  10/30/18 20:06


 


Pulse Ox  98   10/30/18 20:06














- Medical History


PMH: Arthritis, Diabetes (type II), HTN, Hypercholesterolemia, Hyperlipidemia, 

Hypothyroidism


   Denies: Chronic Kidney Disease





- Surgical History


Surgical History: No Surg Hx





- Family History


Family History: States: Unknown Family Hx





- Social History


Current smoker - smoking cessation education provided: No


Alcohol: None


Drugs: Denies





- Home Medications


Home Medications: 


                                Ambulatory Orders











 Medication  Instructions  Recorded


 


Levothyroxine [Synthroid] 75 mcg PO DAILY 12/27/15


 


Glimepiride [Amaryl] 1 mg PO BID 12/09/16


 


MetFORMIN ER [Glucophage XR] 750 mg PO DAILY 12/09/16


 


Metoprolol Tartrate [Lopressor] 50 mg PO Q12H 12/09/16


 


Simvastatin [Zocor] 20 mg PO HS 12/09/16


 


Valsartan [Diovan] 320 mg PO DAILY 12/09/16


 


Albuterol HFA [Ventolin HFA 90 1 - 2 puff IH Q4H PRN #1 bottle 06/13/18





mcg/actuation (8 g)]  


 


Azithromycin [Zithromax] 250 mg PO DAILY #6 tab 06/13/18














- Allergies


Allergies/Adverse Reactions: 


                                    Allergies











Allergy/AdvReac Type Severity Reaction Status Date / Time


 


No Known Allergies Allergy   Verified 12/27/15 07:51














Review of Systems


ROS Statement: Except As Marked, All Systems Reviewed And Found Negative


Constitutional: Negative for: Fever, Chills


Cardiovascular: Negative for: Chest Pain


Respiratory: Negative for: Shortness of Breath


Gastrointestinal: Positive for: Hematochezia.  Negative for: Nausea, Vomiting, 

Abdominal Pain, Constipation


Musculoskeletal: Negative for: Back Pain


Neurological: Negative for: Weakness, Numbness (tingling), Headache, Dizziness





Physical Exam





- Reviewed


Nursing Documentation Reviewed: Yes


Vital Signs Reviewed: Yes





- Physical Exam


Appears: Positive for: No Acute Distress


Head Exam: Positive for: ATRAUMATIC, NORMAL INSPECTION, NORMOCEPHALIC


Skin: Positive for: Normal Color, Warm, Dry


Eye Exam: Positive for: Normal appearance, EOMI, PERRL


Neck: Positive for: Painless ROM, Supple


Cardiovascular/Chest: Positive for: Regular Rate, Rhythm.  Negative for: Murmur


Respiratory: Positive for: Normal Breath Sounds.  Negative for: Respiratory 

Distress


Gastrointestinal/Abdominal: Positive for: Normal Exam, Soft.  Negative for: 

Tenderness, Guarding, Rebound


Back: Positive for: Normal Inspection.  Negative for: L CVA Tenderness, R CVA 

Tenderness, Vertebral Tenderness


Rectal: Positive for: Rectal Tone Is: (intact), Other (No gross blood 

identified. Brown stool. Chaperone RN: Kel).  Negative for: Hemorrhoids


Extremity: Positive for: Normal ROM (upper and lower extremities).  Negative for

: Deformity, Swelling


Neurologic/Psych: Positive for: Alert, Oriented.  Negative for: Motor/Sensory 

Deficits





- Laboratory Results


Result Diagrams: 


                                 10/30/18 21:01





                                 10/30/18 21:40


Interpretation Of Abn Labs: no acute





- ECG


O2 Sat by Pulse Oximetry: 98 (RA)


Pulse Ox Interpretation: Normal





- Progress


ED Course And Treament: 





2250:  Stable.  Dr. Cho to fu on imaging.  





Medical Decision Making


Medical Decision Making: 





Time: 20:26


Initial Impression: Bloody stools





Initial Plan:





--Abd Pelvis PO & IV Contrast [CT]


--CMP


--CBC w/ differential


--PTT


--PT


--Sodium Chloride 1,000 ml IV 1,000 mls/hr


--Omnipaque 240 50 ml PO


--Urinalysis 


--Reevaluation








--

--------------------------------------------------------------------------------


---





Scribe Attestation:


Documented by Kobe Connell, acting as a scribe for Neftaly Velazquez MD.





Provider Scribe Attestation:


All medical record entries made by the Scribe were at my direction and 

personally dictated by me. I have reviewed the chart and agree that the record 

accurately reflects my personal performance of the history, physical exam, 

medical decision making, and the department course for this patient. I have also

 personally directed, reviewed, and agree with the discharge instructions and 

disposition.





Disposition





- Clinical Impression


Clinical Impression: 


 Bloody stool








- Patient ED Disposition


Is Patient to be Admitted: Transfer of Care





- Disposition


Disposition Time: 22:50


Condition: STABLE


Patient Signed Over To: Ward Cho 9:00 AM Gerhard Platt MD [facility]        Discharge Instructions:        Discharge Procedure Orders   HOME HEALTH ORDERS   Order Comments: SN, PT Eval and Treat     Order Specific Question Answer Comments   What Home Health Agency is the patient currently using? Other/External      Notify your health care provider if you experience any of the following:  temperature >100.4     Notify your health care provider if you experience any of the following:  severe uncontrolled pain     Notify your health care provider if you experience any of the following:  redness, tenderness, or signs of infection (pain, swelling, redness, odor or green/yellow discharge around incision site)     No dressing needed   Order Comments: Keep the incision clean with mild soap and water. Do not submerge, however, showers are okay. Do not peel glue off; it will fall off on it's own within the next 7-10 days.     Activity as tolerated           Medication List        START taking these medications      HYDROcodone-acetaminophen 5-325 mg per tablet  Commonly known as: NORCO  Take 1 tablet by mouth every 6 (six) hours as needed for Pain.            CONTINUE taking these medications      ALPRAZolam 0.5 MG tablet  Commonly known as: XANAX     aspirin 81 MG Chew     atorvastatin 40 MG tablet  Commonly known as: LIPITOR     BREZTRI AEROSPHERE 160-9-4.8 mcg/actuation Hfaa  Generic drug: budesonide-glycopyr-formoterol     carvediloL 3.125 MG tablet  Commonly known as: COREG     clopidogreL 75 mg tablet  Commonly known as: PLAVIX     CombiVENT RESPIMAT  mcg/actuation inhaler  Generic drug: ipratropium-albuteroL     lisinopriL 5 MG tablet  Commonly known as: PRINIVIL,ZESTRIL     pantoprazole 40 MG tablet  Commonly known as: PROTONIX     temazepam 30 mg capsule  Commonly known as: RESTORIL     torsemide 10 MG Tab  Commonly known as: DEMADEX               Where to Get Your Medications        These medications were sent to  [facility] - [place] - [place], [place] - [address]  [address], [place] [address]      Phone: [phone]   HYDROcodone-acetaminophen 5-325 mg per tablet